# Patient Record
Sex: MALE | Race: BLACK OR AFRICAN AMERICAN | NOT HISPANIC OR LATINO | Employment: FULL TIME | URBAN - METROPOLITAN AREA
[De-identification: names, ages, dates, MRNs, and addresses within clinical notes are randomized per-mention and may not be internally consistent; named-entity substitution may affect disease eponyms.]

---

## 2022-02-01 ENCOUNTER — LAB VISIT (OUTPATIENT)
Dept: LAB | Facility: HOSPITAL | Age: 62
End: 2022-02-01
Attending: STUDENT IN AN ORGANIZED HEALTH CARE EDUCATION/TRAINING PROGRAM
Payer: COMMERCIAL

## 2022-02-01 ENCOUNTER — OFFICE VISIT (OUTPATIENT)
Dept: UROLOGY | Facility: CLINIC | Age: 62
End: 2022-02-01
Payer: COMMERCIAL

## 2022-02-01 VITALS
WEIGHT: 207.13 LBS | HEIGHT: 74 IN | DIASTOLIC BLOOD PRESSURE: 90 MMHG | HEART RATE: 98 BPM | SYSTOLIC BLOOD PRESSURE: 155 MMHG | BODY MASS INDEX: 26.58 KG/M2

## 2022-02-01 DIAGNOSIS — Z12.5 ENCOUNTER FOR PROSTATE CANCER SCREENING: ICD-10-CM

## 2022-02-01 DIAGNOSIS — R97.20 ELEVATED PSA: Primary | ICD-10-CM

## 2022-02-01 LAB
BILIRUB SERPL-MCNC: ABNORMAL MG/DL
BLOOD URINE, POC: ABNORMAL
CLARITY, POC UA: ABNORMAL
COLOR, POC UA: YELLOW
COMPLEXED PSA SERPL-MCNC: 9 NG/ML (ref 0–4)
GLUCOSE UR QL STRIP: 50
KETONES UR QL STRIP: ABNORMAL
LEUKOCYTE ESTERASE URINE, POC: ABNORMAL
NITRITE, POC UA: ABNORMAL
PH, POC UA: 5
PROTEIN, POC: ABNORMAL
SPECIFIC GRAVITY, POC UA: 1.02
UROBILINOGEN, POC UA: ABNORMAL

## 2022-02-01 PROCEDURE — 3008F BODY MASS INDEX DOCD: CPT | Mod: CPTII,S$GLB,, | Performed by: STUDENT IN AN ORGANIZED HEALTH CARE EDUCATION/TRAINING PROGRAM

## 2022-02-01 PROCEDURE — 36415 COLL VENOUS BLD VENIPUNCTURE: CPT | Performed by: STUDENT IN AN ORGANIZED HEALTH CARE EDUCATION/TRAINING PROGRAM

## 2022-02-01 PROCEDURE — 4010F PR ACE/ARB THEARPY RXD/TAKEN: ICD-10-PCS | Mod: CPTII,S$GLB,, | Performed by: STUDENT IN AN ORGANIZED HEALTH CARE EDUCATION/TRAINING PROGRAM

## 2022-02-01 PROCEDURE — 1159F MED LIST DOCD IN RCRD: CPT | Mod: CPTII,S$GLB,, | Performed by: STUDENT IN AN ORGANIZED HEALTH CARE EDUCATION/TRAINING PROGRAM

## 2022-02-01 PROCEDURE — 1160F RVW MEDS BY RX/DR IN RCRD: CPT | Mod: CPTII,S$GLB,, | Performed by: STUDENT IN AN ORGANIZED HEALTH CARE EDUCATION/TRAINING PROGRAM

## 2022-02-01 PROCEDURE — 81002 URINALYSIS NONAUTO W/O SCOPE: CPT | Mod: S$GLB,,, | Performed by: STUDENT IN AN ORGANIZED HEALTH CARE EDUCATION/TRAINING PROGRAM

## 2022-02-01 PROCEDURE — 99999 PR PBB SHADOW E&M-NEW PATIENT-LVL III: ICD-10-PCS | Mod: PBBFAC,,, | Performed by: STUDENT IN AN ORGANIZED HEALTH CARE EDUCATION/TRAINING PROGRAM

## 2022-02-01 PROCEDURE — 1159F PR MEDICATION LIST DOCUMENTED IN MEDICAL RECORD: ICD-10-PCS | Mod: CPTII,S$GLB,, | Performed by: STUDENT IN AN ORGANIZED HEALTH CARE EDUCATION/TRAINING PROGRAM

## 2022-02-01 PROCEDURE — 99203 OFFICE O/P NEW LOW 30 MIN: CPT | Mod: S$GLB,,, | Performed by: STUDENT IN AN ORGANIZED HEALTH CARE EDUCATION/TRAINING PROGRAM

## 2022-02-01 PROCEDURE — 1160F PR REVIEW ALL MEDS BY PRESCRIBER/CLIN PHARMACIST DOCUMENTED: ICD-10-PCS | Mod: CPTII,S$GLB,, | Performed by: STUDENT IN AN ORGANIZED HEALTH CARE EDUCATION/TRAINING PROGRAM

## 2022-02-01 PROCEDURE — 99203 PR OFFICE/OUTPT VISIT, NEW, LEVL III, 30-44 MIN: ICD-10-PCS | Mod: S$GLB,,, | Performed by: STUDENT IN AN ORGANIZED HEALTH CARE EDUCATION/TRAINING PROGRAM

## 2022-02-01 PROCEDURE — 4010F ACE/ARB THERAPY RXD/TAKEN: CPT | Mod: CPTII,S$GLB,, | Performed by: STUDENT IN AN ORGANIZED HEALTH CARE EDUCATION/TRAINING PROGRAM

## 2022-02-01 PROCEDURE — 3077F SYST BP >= 140 MM HG: CPT | Mod: CPTII,S$GLB,, | Performed by: STUDENT IN AN ORGANIZED HEALTH CARE EDUCATION/TRAINING PROGRAM

## 2022-02-01 PROCEDURE — 81002 POCT URINE DIPSTICK WITHOUT MICROSCOPE: ICD-10-PCS | Mod: S$GLB,,, | Performed by: STUDENT IN AN ORGANIZED HEALTH CARE EDUCATION/TRAINING PROGRAM

## 2022-02-01 PROCEDURE — 3080F PR MOST RECENT DIASTOLIC BLOOD PRESSURE >= 90 MM HG: ICD-10-PCS | Mod: CPTII,S$GLB,, | Performed by: STUDENT IN AN ORGANIZED HEALTH CARE EDUCATION/TRAINING PROGRAM

## 2022-02-01 PROCEDURE — 99999 PR PBB SHADOW E&M-NEW PATIENT-LVL III: CPT | Mod: PBBFAC,,, | Performed by: STUDENT IN AN ORGANIZED HEALTH CARE EDUCATION/TRAINING PROGRAM

## 2022-02-01 PROCEDURE — 3080F DIAST BP >= 90 MM HG: CPT | Mod: CPTII,S$GLB,, | Performed by: STUDENT IN AN ORGANIZED HEALTH CARE EDUCATION/TRAINING PROGRAM

## 2022-02-01 PROCEDURE — 3008F PR BODY MASS INDEX (BMI) DOCUMENTED: ICD-10-PCS | Mod: CPTII,S$GLB,, | Performed by: STUDENT IN AN ORGANIZED HEALTH CARE EDUCATION/TRAINING PROGRAM

## 2022-02-01 PROCEDURE — 3077F PR MOST RECENT SYSTOLIC BLOOD PRESSURE >= 140 MM HG: ICD-10-PCS | Mod: CPTII,S$GLB,, | Performed by: STUDENT IN AN ORGANIZED HEALTH CARE EDUCATION/TRAINING PROGRAM

## 2022-02-01 PROCEDURE — 84153 ASSAY OF PSA TOTAL: CPT | Performed by: STUDENT IN AN ORGANIZED HEALTH CARE EDUCATION/TRAINING PROGRAM

## 2022-02-01 RX ORDER — SIMVASTATIN 20 MG/1
TABLET, FILM COATED ORAL
COMMUNITY

## 2022-02-01 NOTE — PROGRESS NOTES
Subjective:       Patient ID: Macho Maguire is a 61 y.o. male.    Chief Complaint:  Establish care  This is a 61 y.o.  male patient that is new to me.  The patient was self referred to me to establish care. He was previously established with Stu Hauser at [facility]. Biopsy performed 3 years ago, he states that the PSA high but not as high as 7. Probably around 4 or 5. Neg biopsy.     Race: AA  Age: 61 y.o.   Previously abnormal PSA: yes  Previous biopsy or recommended a biopsy: yes; neg TRUS bx with Dr. 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  \  He believes his PSA was 7 when checked around 11/2021.           LAST PSA  No results found for: PSA, PSADIAG, PSATOTAL, PSAFREE    No results found for: CREATININE    ---  No past medical history on file.    No past surgical history on file.    No family history on file.    Social History     Tobacco Use    Smoking status: Never Smoker    Smokeless tobacco: Never Used   Substance Use Topics    Alcohol use: Never    Drug use: Never       Current Outpatient Medications on File Prior to Visit   Medication Sig Dispense Refill    glyburide/metformin HCl (GLYBURIDE-METFORMIN ORAL)       LISINOPRIL ORAL       simvastatin (ZOCOR) 20 MG tablet        No current facility-administered medications on file prior to visit.       Review of patient's allergies indicates:  No Known Allergies    Review of Systems   Constitutional: Negative for chills.   HENT: Negative for congestion.    Eyes: Negative for visual disturbance.   Respiratory: Negative for shortness of breath.    Cardiovascular: Negative for chest pain.   Gastrointestinal: Negative for abdominal distention.   Musculoskeletal: Negative for gait  problem.   Skin: Negative for color change.   Neurological: Negative for dizziness.   Psychiatric/Behavioral: Negative for agitation.       Objective:      Physical Exam  Constitutional:       Appearance: He is well-developed and well-nourished.   HENT:      Head: Normocephalic.   Eyes:      Pupils: Pupils are equal, round, and reactive to light.   Cardiovascular:      Pulses: Intact distal pulses.   Pulmonary:      Effort: Pulmonary effort is normal.   Abdominal:      Palpations: Abdomen is soft.   Musculoskeletal:         General: Normal range of motion.      Cervical back: Normal range of motion.   Skin:     General: Skin is warm and dry.   Neurological:      Mental Status: He is alert.   Psychiatric:         Mood and Affect: Mood and affect normal.         Assessment:       1. Elevated PSA    2. Encounter for prostate cancer screening        Plan:         1. The patient was counseled on the implications of an elevated PSA and velocity. It was explained in detail that this is a screening test and does not indicate any known presence of prostate cancer but that he may be at an increased risk given that he has an elevated PSA. The PSA is a nonspecific test and may be elevated due to prostatitis, BPH, recent urologic instrumentation, or prostate cancer.      2. PSA elevation, neg TRUS bx in the past 3 years at . Will obtain another PSA today and obtain records and will have pt sign MILAN.    3. I will reach out via portal with results and my impression. Might check q3-6 months to establish a baseline. If ever considering proceeding with bx, would consider MRI prior.        Elevated PSA  -     POCT URINE DIPSTICK WITHOUT MICROSCOPE    Encounter for prostate cancer screening

## 2022-02-02 ENCOUNTER — PATIENT MESSAGE (OUTPATIENT)
Dept: UROLOGY | Facility: CLINIC | Age: 62
End: 2022-02-02
Payer: COMMERCIAL

## 2022-02-16 ENCOUNTER — PATIENT MESSAGE (OUTPATIENT)
Dept: UROLOGY | Facility: CLINIC | Age: 62
End: 2022-02-16
Payer: COMMERCIAL

## 2022-03-07 ENCOUNTER — LAB VISIT (OUTPATIENT)
Dept: LAB | Facility: HOSPITAL | Age: 62
End: 2022-03-07
Attending: STUDENT IN AN ORGANIZED HEALTH CARE EDUCATION/TRAINING PROGRAM
Payer: COMMERCIAL

## 2022-03-07 DIAGNOSIS — Z12.5 ENCOUNTER FOR PROSTATE CANCER SCREENING: ICD-10-CM

## 2022-03-07 DIAGNOSIS — R97.20 ELEVATED PSA: ICD-10-CM

## 2022-03-07 LAB
ANION GAP SERPL CALC-SCNC: 5 MMOL/L (ref 8–16)
BUN SERPL-MCNC: 11 MG/DL (ref 6–20)
CALCIUM SERPL-MCNC: 9.3 MG/DL (ref 8.7–10.5)
CHLORIDE SERPL-SCNC: 108 MMOL/L (ref 95–110)
CO2 SERPL-SCNC: 26 MMOL/L (ref 23–29)
CREAT SERPL-MCNC: 1.1 MG/DL (ref 0.5–1.4)
EST. GFR  (AFRICAN AMERICAN): >60 ML/MIN/1.73 M^2
EST. GFR  (NON AFRICAN AMERICAN): >60 ML/MIN/1.73 M^2
GLUCOSE SERPL-MCNC: 177 MG/DL (ref 70–110)
POTASSIUM SERPL-SCNC: 3.7 MMOL/L (ref 3.5–5.1)
SODIUM SERPL-SCNC: 139 MMOL/L (ref 136–145)

## 2022-03-07 PROCEDURE — 36415 COLL VENOUS BLD VENIPUNCTURE: CPT | Performed by: STUDENT IN AN ORGANIZED HEALTH CARE EDUCATION/TRAINING PROGRAM

## 2022-03-07 PROCEDURE — 80048 BASIC METABOLIC PNL TOTAL CA: CPT | Performed by: STUDENT IN AN ORGANIZED HEALTH CARE EDUCATION/TRAINING PROGRAM

## 2022-03-14 ENCOUNTER — HOSPITAL ENCOUNTER (OUTPATIENT)
Dept: RADIOLOGY | Facility: HOSPITAL | Age: 62
Discharge: HOME | End: 2022-03-14
Attending: STUDENT IN AN ORGANIZED HEALTH CARE EDUCATION/TRAINING PROGRAM
Payer: COMMERCIAL

## 2022-03-14 DIAGNOSIS — Z12.5 ENCOUNTER FOR PROSTATE CANCER SCREENING: ICD-10-CM

## 2022-03-14 DIAGNOSIS — R97.20 ELEVATED PSA: ICD-10-CM

## 2022-03-14 PROCEDURE — 25500020 PHARM REV CODE 255: Performed by: STUDENT IN AN ORGANIZED HEALTH CARE EDUCATION/TRAINING PROGRAM

## 2022-03-14 PROCEDURE — 72197 MRI PELVIS W/O & W/DYE: CPT | Mod: TC

## 2022-03-14 PROCEDURE — 72197 MRI PROSTATE W W/O CONTRAST: ICD-10-PCS | Mod: 26,,, | Performed by: RADIOLOGY

## 2022-03-14 PROCEDURE — A9585 GADOBUTROL INJECTION: HCPCS | Performed by: STUDENT IN AN ORGANIZED HEALTH CARE EDUCATION/TRAINING PROGRAM

## 2022-03-14 PROCEDURE — 72197 MRI PELVIS W/O & W/DYE: CPT | Mod: 26,,, | Performed by: RADIOLOGY

## 2022-03-14 RX ORDER — GADOBUTROL 604.72 MG/ML
10 INJECTION INTRAVENOUS
Status: COMPLETED | OUTPATIENT
Start: 2022-03-14 | End: 2022-03-14

## 2022-03-14 RX ADMIN — GADOBUTROL 10 ML: 604.72 INJECTION INTRAVENOUS at 04:03

## 2022-03-17 ENCOUNTER — OFFICE VISIT (OUTPATIENT)
Dept: UROLOGY | Facility: CLINIC | Age: 62
End: 2022-03-17
Payer: COMMERCIAL

## 2022-03-17 VITALS
SYSTOLIC BLOOD PRESSURE: 132 MMHG | DIASTOLIC BLOOD PRESSURE: 82 MMHG | BODY MASS INDEX: 26.59 KG/M2 | HEIGHT: 74 IN | HEART RATE: 93 BPM

## 2022-03-17 DIAGNOSIS — Z12.5 ENCOUNTER FOR PROSTATE CANCER SCREENING: ICD-10-CM

## 2022-03-17 DIAGNOSIS — R97.20 ELEVATED PSA: Primary | ICD-10-CM

## 2022-03-17 PROCEDURE — 3008F PR BODY MASS INDEX (BMI) DOCUMENTED: ICD-10-PCS | Mod: CPTII,S$GLB,, | Performed by: STUDENT IN AN ORGANIZED HEALTH CARE EDUCATION/TRAINING PROGRAM

## 2022-03-17 PROCEDURE — 99999 PR PBB SHADOW E&M-EST. PATIENT-LVL III: CPT | Mod: PBBFAC,,, | Performed by: STUDENT IN AN ORGANIZED HEALTH CARE EDUCATION/TRAINING PROGRAM

## 2022-03-17 PROCEDURE — 3075F PR MOST RECENT SYSTOLIC BLOOD PRESS GE 130-139MM HG: ICD-10-PCS | Mod: CPTII,S$GLB,, | Performed by: STUDENT IN AN ORGANIZED HEALTH CARE EDUCATION/TRAINING PROGRAM

## 2022-03-17 PROCEDURE — 1160F RVW MEDS BY RX/DR IN RCRD: CPT | Mod: CPTII,S$GLB,, | Performed by: STUDENT IN AN ORGANIZED HEALTH CARE EDUCATION/TRAINING PROGRAM

## 2022-03-17 PROCEDURE — 99999 PR PBB SHADOW E&M-EST. PATIENT-LVL III: ICD-10-PCS | Mod: PBBFAC,,, | Performed by: STUDENT IN AN ORGANIZED HEALTH CARE EDUCATION/TRAINING PROGRAM

## 2022-03-17 PROCEDURE — 1159F MED LIST DOCD IN RCRD: CPT | Mod: CPTII,S$GLB,, | Performed by: STUDENT IN AN ORGANIZED HEALTH CARE EDUCATION/TRAINING PROGRAM

## 2022-03-17 PROCEDURE — 1160F PR REVIEW ALL MEDS BY PRESCRIBER/CLIN PHARMACIST DOCUMENTED: ICD-10-PCS | Mod: CPTII,S$GLB,, | Performed by: STUDENT IN AN ORGANIZED HEALTH CARE EDUCATION/TRAINING PROGRAM

## 2022-03-17 PROCEDURE — 4010F ACE/ARB THERAPY RXD/TAKEN: CPT | Mod: CPTII,S$GLB,, | Performed by: STUDENT IN AN ORGANIZED HEALTH CARE EDUCATION/TRAINING PROGRAM

## 2022-03-17 PROCEDURE — 1159F PR MEDICATION LIST DOCUMENTED IN MEDICAL RECORD: ICD-10-PCS | Mod: CPTII,S$GLB,, | Performed by: STUDENT IN AN ORGANIZED HEALTH CARE EDUCATION/TRAINING PROGRAM

## 2022-03-17 PROCEDURE — 3079F PR MOST RECENT DIASTOLIC BLOOD PRESSURE 80-89 MM HG: ICD-10-PCS | Mod: CPTII,S$GLB,, | Performed by: STUDENT IN AN ORGANIZED HEALTH CARE EDUCATION/TRAINING PROGRAM

## 2022-03-17 PROCEDURE — 99214 PR OFFICE/OUTPT VISIT, EST, LEVL IV, 30-39 MIN: ICD-10-PCS | Mod: S$GLB,,, | Performed by: STUDENT IN AN ORGANIZED HEALTH CARE EDUCATION/TRAINING PROGRAM

## 2022-03-17 PROCEDURE — 3008F BODY MASS INDEX DOCD: CPT | Mod: CPTII,S$GLB,, | Performed by: STUDENT IN AN ORGANIZED HEALTH CARE EDUCATION/TRAINING PROGRAM

## 2022-03-17 PROCEDURE — 4010F PR ACE/ARB THEARPY RXD/TAKEN: ICD-10-PCS | Mod: CPTII,S$GLB,, | Performed by: STUDENT IN AN ORGANIZED HEALTH CARE EDUCATION/TRAINING PROGRAM

## 2022-03-17 PROCEDURE — 3079F DIAST BP 80-89 MM HG: CPT | Mod: CPTII,S$GLB,, | Performed by: STUDENT IN AN ORGANIZED HEALTH CARE EDUCATION/TRAINING PROGRAM

## 2022-03-17 PROCEDURE — 3075F SYST BP GE 130 - 139MM HG: CPT | Mod: CPTII,S$GLB,, | Performed by: STUDENT IN AN ORGANIZED HEALTH CARE EDUCATION/TRAINING PROGRAM

## 2022-03-17 PROCEDURE — 99214 OFFICE O/P EST MOD 30 MIN: CPT | Mod: S$GLB,,, | Performed by: STUDENT IN AN ORGANIZED HEALTH CARE EDUCATION/TRAINING PROGRAM

## 2022-03-17 RX ORDER — GLYBURIDE 1.25 MG/1
TABLET ORAL
COMMUNITY
Start: 2022-01-23

## 2022-03-17 RX ORDER — DIAZEPAM 10 MG/1
10 TABLET ORAL ONCE
Qty: 1 TABLET | Refills: 0 | Status: SHIPPED | OUTPATIENT
Start: 2022-03-17 | End: 2022-03-17

## 2022-03-17 RX ORDER — LISINOPRIL 5 MG/1
TABLET ORAL
COMMUNITY
Start: 2022-01-23

## 2022-03-17 RX ORDER — FENOFIBRATE 160 MG/1
TABLET ORAL
COMMUNITY
Start: 2022-01-23

## 2022-03-17 RX ORDER — METFORMIN HYDROCHLORIDE 1000 MG/1
TABLET ORAL
COMMUNITY
Start: 2022-01-23

## 2022-03-17 RX ORDER — CIPROFLOXACIN 500 MG/1
500 TABLET ORAL EVERY 12 HOURS
Qty: 6 TABLET | Refills: 0 | Status: SHIPPED | OUTPATIENT
Start: 2022-03-17 | End: 2022-03-20

## 2022-03-17 NOTE — PROGRESS NOTES
Subjective:       Patient ID: Macho Maguire is a 61 y.o. male.    Chief Complaint: Results  This is a 61 y.o.  male patient that is an established patient of [facility].    The patient was self referred to me to establish care. He was previously established with Stu Hauser at [facility]. Biopsy performed 3 years ago, he states that the PSA high but not as high as 7. Probably around 4 or 5. Neg biopsy.     Race: AA  Age: 61 y.o.   Previously abnormal PSA: yes  Previous biopsy or recommended a biopsy: yes; neg TRUS bx with Dr. 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  He believes his PSA was 7 when checked around 11/2021. We rechecked a PSA after his visit with me and it resulted at 9. Therefore because he had a neg TRUS bx I ordered an MRI of the prostate.    3/17/22  Here to review MRI prostate 3/14/22 - volume 79.67 cc. Transitional zone: Benign prostatic hyperplasia without focal suspicious abnormality, score 2. Because of pirads 2 only, no targets created as clinically significant cancer is unlikely to be present.     LAST PSA  Lab Results   Component Value Date    PSADIAG 9.0 (H) 02/01/2022       Lab Results   Component Value Date    CREATININE 1.1 03/07/2022       ---  History reviewed. No pertinent past medical history.    History reviewed. No pertinent surgical history.    History reviewed. No pertinent family history.    Social History     Tobacco Use    Smoking status: Never Smoker    Smokeless tobacco: Never Used   Substance Use Topics    Alcohol use: Never    Drug use: Never       Current Outpatient Medications on File Prior to Visit   Medication Sig Dispense Refill    fenofibrate 160 MG Tab       glyBURIDE (DIABETA) 1.25 MG Tab        lisinopriL (PRINIVIL,ZESTRIL) 5 MG tablet       metFORMIN (GLUCOPHAGE) 1000 MG tablet       simvastatin (ZOCOR) 20 MG tablet       [DISCONTINUED] glyburide/metformin HCl (GLYBURIDE-METFORMIN ORAL)       [DISCONTINUED] LISINOPRIL ORAL        No current facility-administered medications on file prior to visit.       Review of patient's allergies indicates:  No Known Allergies    Review of Systems   Constitutional: Negative for chills.   HENT: Negative for congestion.    Eyes: Negative for visual disturbance.   Respiratory: Negative for shortness of breath.    Cardiovascular: Negative for chest pain.   Gastrointestinal: Negative for abdominal distention.   Musculoskeletal: Negative for gait problem.   Skin: Negative for color change.   Neurological: Negative for dizziness.   Psychiatric/Behavioral: Negative for agitation.       Objective:      Physical Exam  Constitutional:       Appearance: He is well-developed.   HENT:      Head: Normocephalic.   Eyes:      Pupils: Pupils are equal, round, and reactive to light.   Pulmonary:      Effort: Pulmonary effort is normal.   Abdominal:      Palpations: Abdomen is soft.   Musculoskeletal:         General: Normal range of motion.      Cervical back: Normal range of motion.   Skin:     General: Skin is warm and dry.   Neurological:      Mental Status: He is alert.         Assessment:       1. Elevated PSA    2. Encounter for prostate cancer screening        Plan:         1.  The patient was counseled on the implications of an elevated PSA and velocity. It was explained in detail that this is a screening test and does not indicate any known presence of prostate cancer but that he may be at an increased risk given that he has an elevated PSA. The PSA is a nonspecific test and may be elevated due to prostatitis, BPH, recent urologic instrumentation, or prostate cancer.      2. Even with a reassuring MRI, I would still recommend a TRUS biopsy given the increase in PSA over  time.     3. If again a negative TRUS bx, will check PSAs q6 months going forward.     4. The patient will be scheduled for a prostate biopsy.  The risks and benefits of the procedure were discussed with the patient in detail.  The risks include but are not limited to bleeding, infection (including rare incidence of severe infection and sepsis, pain, erectile dysfunction, urinary retention, and the need for further procedures.  The patient was told to stop all blood thinners at least one week prior to the procedure.  The patient will do a fleets enema the AM of the biopsy and begin a course of ciprofloxacin the evening prior to the procedure and complete as prescribed.  All questions were answered by me fully. Valium also sent in that pt will bring to office. He understands he has to secure a ride home if he wants to take the valium.    Elevated PSA  -     ciprofloxacin HCl (CIPRO) 500 MG tablet; Take 1 tablet (500 mg total) by mouth every 12 (twelve) hours. for 3 days  Dispense: 6 tablet; Refill: 0  -     sodium phosphates 19-7 gram/197 mL Enem; Place 1 enema rectally once. for 1 dose  Dispense: 1 enema; Refill: 0  -     Transrectal Ultrasound w/ Biopsy; Future; Expected date: 03/17/2022  -     diazePAM (VALIUM) 10 MG Tab; Take 1 tablet (10 mg total) by mouth once. for 1 dose  Dispense: 1 tablet; Refill: 0    Encounter for prostate cancer screening

## 2022-04-04 ENCOUNTER — PROCEDURE VISIT (OUTPATIENT)
Dept: UROLOGY | Facility: CLINIC | Age: 62
End: 2022-04-04
Payer: COMMERCIAL

## 2022-04-04 VITALS
HEART RATE: 93 BPM | TEMPERATURE: 99 F | BODY MASS INDEX: 27.01 KG/M2 | WEIGHT: 210.44 LBS | HEIGHT: 74 IN | DIASTOLIC BLOOD PRESSURE: 73 MMHG | SYSTOLIC BLOOD PRESSURE: 135 MMHG

## 2022-04-04 DIAGNOSIS — R97.20 ELEVATED PSA: ICD-10-CM

## 2022-04-04 PROCEDURE — 88344 PR IHC OR ICC EACH MULTIPLEX ANTIBODY STAIN PROCEDURE: ICD-10-PCS | Mod: 26,,, | Performed by: PATHOLOGY

## 2022-04-04 PROCEDURE — 88344 IMHCHEM/IMCYTCHM EA MLT ANTB: CPT | Mod: 26,,, | Performed by: PATHOLOGY

## 2022-04-04 PROCEDURE — 88305 TISSUE EXAM BY PATHOLOGIST: CPT | Mod: 26,,, | Performed by: PATHOLOGY

## 2022-04-04 PROCEDURE — 76872 PR US TRANSRECTAL: ICD-10-PCS | Mod: 26,S$GLB,, | Performed by: STUDENT IN AN ORGANIZED HEALTH CARE EDUCATION/TRAINING PROGRAM

## 2022-04-04 PROCEDURE — 88305 TISSUE EXAM BY PATHOLOGIST: CPT | Performed by: PATHOLOGY

## 2022-04-04 PROCEDURE — 88305 TISSUE EXAM BY PATHOLOGIST: ICD-10-PCS | Mod: 26,,, | Performed by: PATHOLOGY

## 2022-04-04 PROCEDURE — 88344 IMHCHEM/IMCYTCHM EA MLT ANTB: CPT | Performed by: PATHOLOGY

## 2022-04-04 PROCEDURE — 55700 PR BIOPSY OF PROSTATE,NEEDLE/PUNCH: ICD-10-PCS | Mod: S$GLB,,, | Performed by: STUDENT IN AN ORGANIZED HEALTH CARE EDUCATION/TRAINING PROGRAM

## 2022-04-04 PROCEDURE — 76872 US TRANSRECTAL: CPT | Mod: 26,S$GLB,, | Performed by: STUDENT IN AN ORGANIZED HEALTH CARE EDUCATION/TRAINING PROGRAM

## 2022-04-04 PROCEDURE — 55700 PR BIOPSY OF PROSTATE,NEEDLE/PUNCH: CPT | Mod: S$GLB,,, | Performed by: STUDENT IN AN ORGANIZED HEALTH CARE EDUCATION/TRAINING PROGRAM

## 2022-04-04 NOTE — PROCEDURES
Procedures   Prostate Biopsy Procedure Note  Date of procedure:  04/04/2022    Pre-procedure diagnosis: Elevated PSA  Lab Results   Component Value Date    PSADIAG 9.0 (H) 02/01/2022       Post-procedure diagnosis: same    Procedure: Transrectal ultrasound of the prostate, pudendal nerve injection for numbing, ultrasound guided needle biopsy of prostate      Complications: none    Blood loss: minimal    Surgeon: Rachel Krishnan MD    Anesthesia: 10cc lidocaine jelly, 10cc 1% lidocaine for prostatic block    DILLON findings: >50g, no nodules  TRUS size: 90.5cc    Procedure: The risks and benefits of the procedure were explained to the patient and consent was obtained.  The patient laid in the lateral decubitus position.  10cc of lidocaine jelly was used for local analgesia in the rectum.  To perform the transrectal ultrasound, the ultrasound probe was advanced into the rectum. The bladder was noted to be empty.  The prostate was visualized. A median lobe was present.     10cc of 1% lidocaine without epi was used for a prostatic nerve block by injecting the numbing medication into the pudendal nerve. Using the ultrasound image as guidance, 16 biopsies were then taken, 2 from the lateral and mid apex, mid, base, and transition zones from the right and left side using a needle punch to perform the biopsies.     The patient tolerated the procedure well. He will finish up the antibiotics today and tomorrow. He will follow-up in 2 weeks for a discussion of the pathology results.

## 2022-04-12 LAB
FINAL PATHOLOGIC DIAGNOSIS: NORMAL
Lab: NORMAL

## 2022-04-18 ENCOUNTER — OFFICE VISIT (OUTPATIENT)
Dept: UROLOGY | Facility: CLINIC | Age: 62
End: 2022-04-18
Payer: COMMERCIAL

## 2022-04-18 VITALS
DIASTOLIC BLOOD PRESSURE: 85 MMHG | SYSTOLIC BLOOD PRESSURE: 130 MMHG | WEIGHT: 209.44 LBS | HEART RATE: 104 BPM | BODY MASS INDEX: 26.88 KG/M2 | HEIGHT: 74 IN

## 2022-04-18 DIAGNOSIS — C61 PROSTATE CANCER: Primary | ICD-10-CM

## 2022-04-18 DIAGNOSIS — R97.20 ELEVATED PSA: ICD-10-CM

## 2022-04-18 PROCEDURE — 1160F PR REVIEW ALL MEDS BY PRESCRIBER/CLIN PHARMACIST DOCUMENTED: ICD-10-PCS | Mod: CPTII,S$GLB,, | Performed by: STUDENT IN AN ORGANIZED HEALTH CARE EDUCATION/TRAINING PROGRAM

## 2022-04-18 PROCEDURE — 1159F MED LIST DOCD IN RCRD: CPT | Mod: CPTII,S$GLB,, | Performed by: STUDENT IN AN ORGANIZED HEALTH CARE EDUCATION/TRAINING PROGRAM

## 2022-04-18 PROCEDURE — 99999 PR PBB SHADOW E&M-EST. PATIENT-LVL III: CPT | Mod: PBBFAC,,, | Performed by: STUDENT IN AN ORGANIZED HEALTH CARE EDUCATION/TRAINING PROGRAM

## 2022-04-18 PROCEDURE — 3075F PR MOST RECENT SYSTOLIC BLOOD PRESS GE 130-139MM HG: ICD-10-PCS | Mod: CPTII,S$GLB,, | Performed by: STUDENT IN AN ORGANIZED HEALTH CARE EDUCATION/TRAINING PROGRAM

## 2022-04-18 PROCEDURE — 99999 PR PBB SHADOW E&M-EST. PATIENT-LVL III: ICD-10-PCS | Mod: PBBFAC,,, | Performed by: STUDENT IN AN ORGANIZED HEALTH CARE EDUCATION/TRAINING PROGRAM

## 2022-04-18 PROCEDURE — 3079F DIAST BP 80-89 MM HG: CPT | Mod: CPTII,S$GLB,, | Performed by: STUDENT IN AN ORGANIZED HEALTH CARE EDUCATION/TRAINING PROGRAM

## 2022-04-18 PROCEDURE — 4010F ACE/ARB THERAPY RXD/TAKEN: CPT | Mod: CPTII,S$GLB,, | Performed by: STUDENT IN AN ORGANIZED HEALTH CARE EDUCATION/TRAINING PROGRAM

## 2022-04-18 PROCEDURE — 3008F BODY MASS INDEX DOCD: CPT | Mod: CPTII,S$GLB,, | Performed by: STUDENT IN AN ORGANIZED HEALTH CARE EDUCATION/TRAINING PROGRAM

## 2022-04-18 PROCEDURE — 1160F RVW MEDS BY RX/DR IN RCRD: CPT | Mod: CPTII,S$GLB,, | Performed by: STUDENT IN AN ORGANIZED HEALTH CARE EDUCATION/TRAINING PROGRAM

## 2022-04-18 PROCEDURE — 4010F PR ACE/ARB THEARPY RXD/TAKEN: ICD-10-PCS | Mod: CPTII,S$GLB,, | Performed by: STUDENT IN AN ORGANIZED HEALTH CARE EDUCATION/TRAINING PROGRAM

## 2022-04-18 PROCEDURE — 1159F PR MEDICATION LIST DOCUMENTED IN MEDICAL RECORD: ICD-10-PCS | Mod: CPTII,S$GLB,, | Performed by: STUDENT IN AN ORGANIZED HEALTH CARE EDUCATION/TRAINING PROGRAM

## 2022-04-18 PROCEDURE — 99214 PR OFFICE/OUTPT VISIT, EST, LEVL IV, 30-39 MIN: ICD-10-PCS | Mod: S$GLB,,, | Performed by: STUDENT IN AN ORGANIZED HEALTH CARE EDUCATION/TRAINING PROGRAM

## 2022-04-18 PROCEDURE — 3075F SYST BP GE 130 - 139MM HG: CPT | Mod: CPTII,S$GLB,, | Performed by: STUDENT IN AN ORGANIZED HEALTH CARE EDUCATION/TRAINING PROGRAM

## 2022-04-18 PROCEDURE — 3079F PR MOST RECENT DIASTOLIC BLOOD PRESSURE 80-89 MM HG: ICD-10-PCS | Mod: CPTII,S$GLB,, | Performed by: STUDENT IN AN ORGANIZED HEALTH CARE EDUCATION/TRAINING PROGRAM

## 2022-04-18 PROCEDURE — 99214 OFFICE O/P EST MOD 30 MIN: CPT | Mod: S$GLB,,, | Performed by: STUDENT IN AN ORGANIZED HEALTH CARE EDUCATION/TRAINING PROGRAM

## 2022-04-18 PROCEDURE — 3008F PR BODY MASS INDEX (BMI) DOCUMENTED: ICD-10-PCS | Mod: CPTII,S$GLB,, | Performed by: STUDENT IN AN ORGANIZED HEALTH CARE EDUCATION/TRAINING PROGRAM

## 2022-04-18 NOTE — PROGRESS NOTES
Subjective:       Patient ID: Macho Maguire is a 61 y.o. male.    Chief Complaint: Follow-up (Path results)  This is a 61 y.o.  male patient that is an established patient of [facility].  he patient was self referred to me to establish care. He was previously established with Stu Hauser at [facility]. Biopsy performed 3 years ago, he states that the PSA high but not as high as 7. Probably around 4 or 5. Neg biopsy.     Race: AA  Age: 61 y.o.   Previously abnormal PSA: yes  Previous biopsy or recommended a biopsy: yes; neg TRUS bx with Dr. 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  He believes his PSA was 7 when checked around 11/2021. We rechecked a PSA after his visit with me and it resulted at 9. Therefore because he had a neg TRUS bx I ordered an MRI of the prostate.    3/17/22  Here to review MRI prostate 3/14/22 - volume 79.67 cc. Transitional zone: Benign prostatic hyperplasia without focal suspicious abnormality, score 2. Because of pirads 2 only, no targets created as clinically significant cancer is unlikely to be present.     He underwent a TRUS bx with me on 4/4/22 -  DILLON findings: >50g, no nodules. TRUS size: 90.5cc.     Path demonstrated 3 cores pos of a 18 core bx demonstrated [place] 3+4 prostate cancer, with up to 50% of a core involved. He is here today to review options    RELIAPATH DIAGNOSIS:   A.   PROSTATE, LEFT APEX, NEEDLE BIOPSY:          Benign prostatic tissue with chronic inflammation.  (R97.20)   B.   PROSTATE, LEFT MID, NEEDLE BIOPSY:          Benign prostatic tissue with chronic inflammation.  (R97.20)   C.   PROSTATE, LEFT BASE, NEEDLE BIOPSY:          Benign prostatic tissue with chronic inflammation.  (R97.20)   D.    TISSUE 1          LEFT TRANSITION ZONE, BIOPSY:          Benign prostatic tissue with chronic inflammation.  (R97.20):   E.    PROSTATE, RIGHT APEX, NEEDLE BIOPSY:          PROSTATE GLAND:  NEEDLE BIOPSY          HISTOLOGIC TYPE:  Acinar adenocarcinoma, see comment.          HISTOLOGIC GRADE:          Grade Group 2 (Peridot Score 3+4=7).            Percentage of Pattern 4 in a 3+4=7 Cancer:  30%         TUMOR QUANTITATION:            Number of Cores Positive:  3.            Total Number of Cores:  4.            Measurement of Tumor:  3 mm.            Measurement of Core Tissue:  6 mm.            Percentage of Prostatic Tissue Involved by Tumor:  50%.          LYMPHOVASCULAR INVASION:  Not identified.          PERINEURAL INVASION:  Not identified.  (C61.)   F.   PROSTATE, RIGHT MID, NEEDLE BIOPSY:          Benign prostatic tissue with chronic inflammation.  (R97.20)   G.   PROSTATE, RIGHT BASE, NEEDLE BIOPSY:           Benign prostatic tissue with chronic inflammation.  (R97.20)   H.   TISSUE2          RIGHT TRANSITION ZONE, BIOPSY:          Benign prostatic tissue with chronic inflammation.  (R97.20)   COMMENT:   E).  *Immunohistochemical stain for prostate triple stain was performed on   block E1 and results of staining support the above diagnosis.     LAST PSA  Lab Results   Component Value Date    PSADIAG 9.0 (H) 02/01/2022       Lab Results   Component Value Date    CREATININE 1.1 03/07/2022       ---  History reviewed. No pertinent past medical history.    History reviewed. No pertinent surgical history.    History reviewed. No pertinent family history.    Social History     Tobacco Use    Smoking status: Never Smoker    Smokeless tobacco: Never Used   Substance Use Topics    Alcohol use: Never    Drug use: Never       Current Outpatient Medications on File Prior to Visit   Medication Sig Dispense Refill    fenofibrate 160 MG Tab       glyBURIDE (DIABETA) 1.25 MG Tab       lisinopriL (PRINIVIL,ZESTRIL) 5  MG tablet       metFORMIN (GLUCOPHAGE) 1000 MG tablet       simvastatin (ZOCOR) 20 MG tablet       diazePAM (VALIUM) 10 MG Tab Take 1 tablet (10 mg total) by mouth once. for 1 dose 1 tablet 0     No current facility-administered medications on file prior to visit.       Review of patient's allergies indicates:  No Known Allergies    Review of Systems   Constitutional: Negative for chills.   HENT: Negative for congestion.    Eyes: Negative for visual disturbance.   Respiratory: Negative for shortness of breath.    Cardiovascular: Negative for chest pain.   Gastrointestinal: Negative for abdominal distention.   Musculoskeletal: Negative for gait problem.   Skin: Negative for color change.   Neurological: Negative for dizziness.   Psychiatric/Behavioral: Negative for agitation.       Objective:      Physical Exam  Constitutional:       Appearance: He is well-developed.   HENT:      Head: Normocephalic.   Eyes:      Pupils: Pupils are equal, round, and reactive to light.   Pulmonary:      Effort: Pulmonary effort is normal.   Abdominal:      Palpations: Abdomen is soft.   Musculoskeletal:         General: Normal range of motion.      Cervical back: Normal range of motion.   Skin:     General: Skin is warm and dry.   Neurological:      Mental Status: He is alert.         Assessment:       1. Prostate cancer    2. Elevated PSA        Plan:         [email or web address]    Today's visit was spent almost entirely on prostate cancer counseling. 30 minutes of counseling time was spent.  We reviewed his diagnosis, stage, grade, and prognosis.      We reviewed the [facility] nomograms.    Primary treatment outcomes:  His probability of cancer specific survival after radical prostatectomy is  99% at 10 years and   99% at 15 years.    His progression free probability after radical prostatectomy is   89% at 5 years and   81% at 10 years.      Extent of disease probability:  His likelihood of organ  confined disease is 76%, extracapsular extension is 22%, lymph node involvement is 2%, and seminal vesicle invasion is 1%.     We discussed the concept of risk stratification: low risk, intermediate risk, and high risk and very high risk disease. The patient falls into the category of favorable intermediate risk based off of clinical/pathologic features. He has a [place] 3+ 4 prostate cancer, Grade group 2.    We discussed the different treatment options including active surveillance (as well as surveillance protocol), prostate brachytherapy, IMRT, IGRT, cryotherapy, and both open and robotic prostatectomy.  We also discussed the advantages, disadvantages, risks and benefits of the different options. Regarding radiation therapy we discussed treatment planning, the different techniques, short and long term complications. These included radiation cystitis, radiation proctitis, and impotence. We discussed success, failure, and salvage therapeutic options.      Currently patient interested in radiation therapy over surgery. Will place referral to Dr. Mancilla to discussion. Will also pass on name of surgeons who would be able to offer RARP. Due to MRI vol I feel that it might be more challenging of a case than I typically handle.     Prostate cancer    Elevated PSA

## 2022-04-20 ENCOUNTER — TELEPHONE (OUTPATIENT)
Dept: RADIATION ONCOLOGY | Facility: CLINIC | Age: 62
End: 2022-04-20
Payer: COMMERCIAL

## 2022-04-20 ENCOUNTER — PATIENT MESSAGE (OUTPATIENT)
Dept: UROLOGY | Facility: CLINIC | Age: 62
End: 2022-04-20
Payer: COMMERCIAL

## 2022-04-20 NOTE — TELEPHONE ENCOUNTER
Called to schedule consultation; no answer, left voicemail requesting a call back. Contact information provided.

## 2022-05-06 ENCOUNTER — TELEPHONE (OUTPATIENT)
Dept: RADIATION ONCOLOGY | Facility: CLINIC | Age: 62
End: 2022-05-06
Payer: COMMERCIAL

## 2022-05-06 NOTE — TELEPHONE ENCOUNTER
Called to schedule consultation; no answer, left voicemail requesting a call back. Contact information provided. Also, left message with client's spouse, Melyssa, she voiced she will have him to return my call.

## 2022-05-13 ENCOUNTER — TELEPHONE (OUTPATIENT)
Dept: RADIATION ONCOLOGY | Facility: CLINIC | Age: 62
End: 2022-05-13
Payer: COMMERCIAL

## 2022-05-24 ENCOUNTER — TELEPHONE (OUTPATIENT)
Dept: RADIATION ONCOLOGY | Facility: CLINIC | Age: 62
End: 2022-05-24
Payer: COMMERCIAL

## 2022-05-24 NOTE — TELEPHONE ENCOUNTER
Called to schedule consultation; no answer. Left voicemail requesting a call back. Contact information provided.

## 2022-06-29 ENCOUNTER — PATIENT MESSAGE (OUTPATIENT)
Dept: UROLOGY | Facility: CLINIC | Age: 62
End: 2022-06-29
Payer: COMMERCIAL

## 2022-07-01 ENCOUNTER — TELEPHONE (OUTPATIENT)
Dept: UROLOGY | Facility: CLINIC | Age: 62
End: 2022-07-01
Payer: COMMERCIAL

## 2022-07-01 NOTE — TELEPHONE ENCOUNTER
----- Message from Lexus Lewis sent at 7/1/2022  1:31 PM CDT -----  Contact: [phone]  Who Called: nathalie with [facility]   Regarding: would like to speak with    Would the patient rather a call back or a response via [facility]? Call back  Best Call Back Number:  [phone]   Additional Information:  Dr.Syd Winter  cell  [phone]

## 2022-07-12 ENCOUNTER — TELEPHONE (OUTPATIENT)
Dept: UROLOGY | Facility: CLINIC | Age: 62
End: 2022-07-12
Payer: COMMERCIAL

## 2022-07-12 ENCOUNTER — PATIENT MESSAGE (OUTPATIENT)
Dept: UROLOGY | Facility: CLINIC | Age: 62
End: 2022-07-12
Payer: COMMERCIAL

## 2022-07-12 DIAGNOSIS — C61 PROSTATE CANCER: Primary | ICD-10-CM

## 2022-07-12 NOTE — TELEPHONE ENCOUNTER
Discussed with Dr. Dr. Emir Gonzales    lupron injection as soon as possible.     PSA 3 months after finishes radiotherapy.

## 2022-07-15 ENCOUNTER — PATIENT MESSAGE (OUTPATIENT)
Dept: UROLOGY | Facility: CLINIC | Age: 62
End: 2022-07-15
Payer: COMMERCIAL

## 2022-07-25 ENCOUNTER — CLINICAL SUPPORT (OUTPATIENT)
Dept: UROLOGY | Facility: CLINIC | Age: 62
End: 2022-07-25
Payer: COMMERCIAL

## 2022-07-25 VITALS
HEIGHT: 74 IN | BODY MASS INDEX: 26.89 KG/M2 | HEART RATE: 98 BPM | DIASTOLIC BLOOD PRESSURE: 86 MMHG | TEMPERATURE: 98 F | SYSTOLIC BLOOD PRESSURE: 132 MMHG

## 2022-07-25 PROCEDURE — 96402 CHEMO HORMON ANTINEOPL SQ/IM: CPT | Mod: S$GLB,,, | Performed by: STUDENT IN AN ORGANIZED HEALTH CARE EDUCATION/TRAINING PROGRAM

## 2022-07-25 PROCEDURE — 99999 PR PBB SHADOW E&M-EST. PATIENT-LVL III: ICD-10-PCS | Mod: PBBFAC,,,

## 2022-07-25 PROCEDURE — 96402 PR CHEMOTHER HORMON ANTINEOPL SUB-Q/IM: ICD-10-PCS | Mod: S$GLB,,, | Performed by: STUDENT IN AN ORGANIZED HEALTH CARE EDUCATION/TRAINING PROGRAM

## 2022-07-25 PROCEDURE — 99999 PR PBB SHADOW E&M-EST. PATIENT-LVL III: CPT | Mod: PBBFAC,,,

## 2022-07-25 NOTE — PROGRESS NOTES
Allergies reviewed.  Lupron 45 mg administered to left dorsal gluteal muscle.  Patient waiting in exam room x 15 minutes for observation.  He tolerated well.